# Patient Record
Sex: MALE | Race: WHITE | Employment: PART TIME
[De-identification: names, ages, dates, MRNs, and addresses within clinical notes are randomized per-mention and may not be internally consistent; named-entity substitution may affect disease eponyms.]

---

## 2023-01-16 ENCOUNTER — NURSE TRIAGE (OUTPATIENT)
Dept: OTHER | Facility: CLINIC | Age: 18
End: 2023-01-16

## 2023-01-16 NOTE — TELEPHONE ENCOUNTER
Location of patient: [address] call from [place] [facility]; Patient with [employer] Complaint requesting to establish care with [facility].    Subjective: Caller states \"I feel good right now, I want an appointment because it is on and off. When I eat certain foods it messes me up. \"     Current Symptoms: intermittent chest pain - episodes last \"seconds\", intermittent abdominal cramping with spicy food    Last episode \"the other day\"    Onset: 1 month ago;       Pain Severity: 5/10; Temperature: denies     What has been tried: nothing    Denies - shortness of breath / diaphoresis / heart palpations / dizziness      Recommended disposition: See in Office Today  Patient does not have a PCP and was advised to go to an [address] [place], [place] [address] for current problem. Care advice provided, patient verbalizes understanding; denies any other questions or concerns; instructed to call back for any new or worsening symptoms. Patient/Caller agrees with recommended disposition; writer provided warm transfer to [facility] for appointment scheduling  For new patient appointment scheduling. Attention Provider: Thank you for allowing me to participate in the care of your patient. The patient was connected to triage in response to information provided to the [facility]. Please do not respond through this encounter as the response is not directed to a shared pool.       Reason for Disposition   Chest pain(s) lasting a few seconds persists > 3 days    Protocols used: Chest Pain-ADULT-[place]

## 2023-01-17 ENCOUNTER — APPOINTMENT (OUTPATIENT)
Dept: GENERAL RADIOLOGY | Age: 18
End: 2023-01-17
Payer: COMMERCIAL

## 2023-01-17 ENCOUNTER — HOSPITAL ENCOUNTER (EMERGENCY)
Age: 18
Discharge: HOME | End: 2023-01-18
Payer: COMMERCIAL

## 2023-01-17 DIAGNOSIS — R07.89 COSTOCHONDRAL CHEST PAIN: Primary | ICD-10-CM

## 2023-01-17 PROCEDURE — 71046 X-RAY EXAM CHEST 2 VIEWS: CPT

## 2023-01-17 PROCEDURE — 99284 EMERGENCY DEPT VISIT MOD MDM: CPT

## 2023-01-17 PROCEDURE — 93005 ELECTROCARDIOGRAM TRACING: CPT | Performed by: PHYSICIAN ASSISTANT

## 2023-01-17 PROCEDURE — 96372 THER/PROPH/DIAG INJ SC/IM: CPT

## 2023-01-17 RX ORDER — KETOROLAC TROMETHAMINE 30 MG/ML
30 INJECTION, SOLUTION INTRAMUSCULAR; INTRAVENOUS ONCE
Status: COMPLETED | OUTPATIENT
Start: 2023-01-18 | End: 2023-01-18

## 2023-01-17 ASSESSMENT — PAIN DESCRIPTION - DESCRIPTORS: DESCRIPTORS: HEAVINESS

## 2023-01-17 ASSESSMENT — PAIN DESCRIPTION - LOCATION: LOCATION: CHEST

## 2023-01-17 ASSESSMENT — PAIN - FUNCTIONAL ASSESSMENT: PAIN_FUNCTIONAL_ASSESSMENT: 0-10

## 2023-01-17 ASSESSMENT — PAIN SCALES - GENERAL: PAINLEVEL_OUTOF10: 5

## 2023-01-17 NOTE — Clinical Note
Gwen Chan was seen and treated in our emergency department on 1/17/2023. He may return to work on 01/19/2023. If you have any questions or concerns, please don't hesitate to call.       Briana Allan PA-C

## 2023-01-18 VITALS
WEIGHT: 140 LBS | DIASTOLIC BLOOD PRESSURE: 89 MMHG | RESPIRATION RATE: 16 BRPM | HEIGHT: 69 IN | BODY MASS INDEX: 20.73 KG/M2 | TEMPERATURE: 98.2 F | SYSTOLIC BLOOD PRESSURE: 124 MMHG | OXYGEN SATURATION: 99 % | HEART RATE: 62 BPM

## 2023-01-18 LAB
EKG ATRIAL RATE: 59 BPM
EKG P AXIS: 19 DEGREES
EKG P-R INTERVAL: 108 MS
EKG Q-T INTERVAL: 400 MS
EKG QRS DURATION: 102 MS
EKG QTC CALCULATION (BAZETT): 396 MS
EKG R AXIS: 90 DEGREES
EKG T AXIS: 70 DEGREES
EKG VENTRICULAR RATE: 59 BPM

## 2023-01-18 PROCEDURE — 93010 ELECTROCARDIOGRAM REPORT: CPT | Performed by: INTERNAL MEDICINE

## 2023-01-18 PROCEDURE — 6360000002 HC RX W HCPCS: Performed by: PHYSICIAN ASSISTANT

## 2023-01-18 RX ORDER — IBUPROFEN 800 MG/1
800 TABLET ORAL EVERY 8 HOURS PRN
Qty: 15 TABLET | Refills: 0 | Status: SHIPPED | OUTPATIENT
Start: 2023-01-18

## 2023-01-18 RX ADMIN — KETOROLAC TROMETHAMINE 30 MG: 30 INJECTION, SOLUTION INTRAMUSCULAR; INTRAVENOUS at 00:05

## 2023-01-18 ASSESSMENT — PAIN - FUNCTIONAL ASSESSMENT: PAIN_FUNCTIONAL_ASSESSMENT: NONE - DENIES PAIN

## 2023-01-18 ASSESSMENT — PAIN SCALES - GENERAL: PAINLEVEL_OUTOF10: 6

## 2023-01-18 NOTE — ED TRIAGE NOTES
Pt presents to ED with chief complaint of \"chest heaviness. \" Pt states it has been going on for about a month. States he is unsure if it is due to his acid reflux; has not been taking medication.

## 2023-01-18 NOTE — ED PROVIDER NOTES
[address] [facility] [address] [address] EMERGENCY DEPT      Pt Name: Lynette Panda  MRN: [id number]  [place] 2005  Date of evaluation: 1/17/2023  Provider: Bobo Hines PA-C    CHIEF COMPLAINT       Chief Complaint   Patient presents with    Chest Pain       Nurses Notes reviewed and I agree except as noted in the HPI. HISTORY OF PRESENT ILLNESS    Lynette Panda is a 21 y.o. male who presents for chest pain. Patient has had central substernal chest pain for over a month. It is sharp and intermittent lasting seconds. Pain is worse with certain ways he breathes and better if he settles down. Patient reports that his significant other made him come in this evening for evaluation due to the duration of his symptoms. Patient does a lot of heavy lifting at work as he is a  is unsure if he could have injured himself. He has had some right rib soreness additionally. Patient feels winded at times and also affirms difficulty concentrating and mental fogginess. Patient denies diaphoresis, nausea, vomiting, cough, fever, dizziness, or other complaints. Past medical history is negative. Family history is positive for CVA and diabetes. Patient quit vaping and smoking cigarettes more than 6 months ago. Patient is a social alcohol drinker. Patient denies illicit drug use. Location/Symptom: Substernal chest pain  Timing/Onset: Over a month ago  Context/Setting: Spontaneous onset although patient is a  and does a lot of heavy lifting  Quality: Sharp  Duration: Seconds  Modifying Factors: Worsened with certain ways of breathing; improved with settling down  Severity: Mild to moderate    REVIEW OF SYSTEMS     Review of Systems   Constitutional:  Negative for chills, diaphoresis and fever. HENT:  Negative for congestion, rhinorrhea and sore throat. Eyes:  Negative for photophobia. Respiratory:  Positive for shortness of breath. Negative for cough. Cardiovascular:  Positive for chest pain. Gastrointestinal:  Negative for abdominal pain, diarrhea, nausea and vomiting. Genitourinary:  Negative for difficulty urinating. Musculoskeletal:  Negative for gait problem. Skin:  Negative for rash. Neurological:  Negative for dizziness, weakness, light-headedness and numbness. Psychiatric/Behavioral:  Positive for decreased concentration. Negative for confusion. The patient is not nervous/anxious. PAST MEDICAL HISTORY    has no past medical history on file. SURGICAL HISTORY      has no past surgical history on file. CURRENT MEDICATIONS       Discharge Medication List as of 1/18/2023 12:26 AM          ALLERGIES     has No Known Allergies. FAMILY HISTORY     has no family status information on file. family history is not on file. SOCIAL HISTORY        PHYSICAL EXAM     INITIAL VITALS:  height is 5' 9\" (1.753 m) and weight is 140 lb (63.5 kg). His oral temperature is 98.2 °F (36.8 °C). His blood pressure is 124/89 and his pulse is 62. His respiration is 16 and oxygen saturation is 99%. Physical Exam  Constitutional:       Appearance: Normal appearance. He is well-developed. He is not ill-appearing or diaphoretic. HENT:      Head: Normocephalic and atraumatic. Right Ear: Hearing normal.      Left Ear: Hearing normal.      Nose: Nose normal. No rhinorrhea. Mouth/Throat:      Lips: Pink. Mouth: Mucous membranes are moist.      Pharynx: Oropharynx is clear. Eyes:      General: Lids are normal. No scleral icterus. Extraocular Movements: Extraocular movements intact. Conjunctiva/sclera: Conjunctivae normal.      Pupils: Pupils are equal, round, and reactive to light. Neck:      Trachea: Trachea normal.   Cardiovascular:      Rate and Rhythm: Normal rate and regular rhythm. Heart sounds: Normal heart sounds. No murmur heard. Pulmonary:      Effort: Pulmonary effort is normal.      Breath sounds: Normal breath sounds and air entry.  No decreased breath sounds, wheezing or rhonchi. Abdominal:      General: There is no distension. Palpations: Abdomen is soft. Tenderness: There is no abdominal tenderness. Musculoskeletal:      Cervical back: Normal range of motion and neck supple. No rigidity. Comments: Well perfused; movement normal as observed; no signs of DVT   Lymphadenopathy:      Cervical: No cervical adenopathy. Skin:     General: Skin is warm and dry. Findings: No rash. Neurological:      General: No focal deficit present. Mental Status: He is alert. Sensory: Sensation is intact. Motor: Motor function is intact. Gait: Gait is intact. Psychiatric:         Mood and Affect: Mood normal.         Speech: Speech normal.         Behavior: Behavior is cooperative. DIFFERENTIAL DIAGNOSIS:   Including but not limited to: Costochondritis, pleurisy, GERD, anxiety    Diagnoses Considered but I have low suspicion of:   ACS, TAA, pericardial effusion    DIAGNOSTIC RESULTS     EKG: [facility]'s [facility] who either signs or Co-signs this chart in the absence of a cardiologist.  Ventricular Rate BPM 59    Atrial Rate BPM 59    P-R Interval ms 108    QRS Duration ms 102    Q-T Interval ms 400    QTc Calculation (Bazett) ms 396    P Axis degrees 19    R Axis degrees 90    T Axis degrees 70            Narrative & Impression    Sinus bradycardia with short MN  Rightward axis  Incomplete right bundle branch block  Borderline ECG  No previous ECGs available             RADIOLOGY: non-plain film images(s) such as CT,Ultrasound and MRI are read by the radiologist.  Plain radiographic images are visualized and preliminarily interpreted by the emergency physician unless otherwise stated below. XR CHEST (2 VW)   Final Result   Impression:   No acute pathology. This document has been electronically signed by:  Jersey Doshi MD on    01/18/2023 12:08 AM          LABS:   Labs Reviewed - No data to display    EMERGENCY DEPARTMENT COURSE:   Vitals:    Vitals:    01/17/23 2317 01/18/23 0033   BP: (!) 127/96 124/89   Pulse: 60 62   Resp: 16 16   Temp: 98.2 °F (36.8 °C)    TempSrc: Oral    SpO2: 99% 99%   Weight: 140 lb (63.5 kg)    Height: 5' 9\" (1.753 m)        Decision Rules/Clinical Scores utilized:    Heart Score for chest pain patients  History: Slightly Suspicious  ECG: Normal  Patient Age: < 45 years  Risk Factors: No risk factors known  Troponin: < 1X normal limit  Heart Score Total: 0      MDM:  The patient was seen and evaluated within the ED today for chest pain onset > a month ago. On exam, I appreciated no acute findings. Old records were reviewed. Within the department, I observed the patient's vital signs to be within acceptable range. Radiological studies within the department revealed NAP. Laboratory work was not deemed necessary. Within the department the patient was treated with toradol. I observed the patient's condition to modestly improve during the duration of their stay. On re-exam patient felt improved. Vital signs remained stable. The patient remained non-toxic appearing with no distress evident in the ER. The patient was comfortable with the plan of discharge home and to follow up with [facility]. Anticipatory guidance was given. The patient was instructed to return to the emergency department for any worsening of their symptoms. Patient was discharged from the emergency department in good condition with all questions answered. See disposition below. I have given the patient strict written and verbal instructions about care at home, follow-up, and signs and symptoms of worsening of condition and they did verbalize understanding. Medications   ketorolac (TORADOL) injection 30 mg (30 mg IntraMUSCular Given 1/18/23 0005)       CRITICAL CARE:   None    CONSULTS:  None    PROCEDURES:  None    FINAL IMPRESSION      1.  Costochondral chest pain          DISPOSITION/PLAN     1. Costochondral chest pain        PATIENT REFERRED TO:  [facility]  [address] [address]  [phone]  Schedule an appointment as soon as possible for a visit   Please arrived 15 minutes early for paperwork.    DISCHARGE MEDICATIONS:  Discharge Medication List as of 1/18/2023 12:26 AM        START taking these medications    Details   ibuprofen (ADVIL;MOTRIN) 800 MG tablet Take 1 tablet by mouth every 8 hours as needed for Pain, Disp-15 tablet, R-0Print             (Please note that portions of this note were completed with a voice recognition program.  Efforts were made to edit the dictations but occasionally words are mis-transcribed.)    Nydia Harvey PA-C 01/19/23 8:52 AM    PILLO Keane PA-C  01/19/23 0889

## 2023-01-19 ASSESSMENT — ENCOUNTER SYMPTOMS
PHOTOPHOBIA: 0
SORE THROAT: 0
DIARRHEA: 0
SHORTNESS OF BREATH: 1
ABDOMINAL PAIN: 0
VOMITING: 0
COUGH: 0
RHINORRHEA: 0
NAUSEA: 0

## 2023-01-19 ASSESSMENT — HEART SCORE: ECG: 0
